# Patient Record
Sex: MALE | Race: WHITE | NOT HISPANIC OR LATINO | Employment: OTHER | URBAN - METROPOLITAN AREA
[De-identification: names, ages, dates, MRNs, and addresses within clinical notes are randomized per-mention and may not be internally consistent; named-entity substitution may affect disease eponyms.]

---

## 2024-06-03 ENCOUNTER — OFFICE VISIT (OUTPATIENT)
Dept: NEUROLOGY | Facility: CLINIC | Age: 24
End: 2024-06-03
Payer: COMMERCIAL

## 2024-06-03 ENCOUNTER — LAB (OUTPATIENT)
Dept: LAB | Facility: HOSPITAL | Age: 24
End: 2024-06-03
Payer: COMMERCIAL

## 2024-06-03 VITALS
WEIGHT: 288 LBS | BODY MASS INDEX: 46.28 KG/M2 | DIASTOLIC BLOOD PRESSURE: 78 MMHG | HEART RATE: 69 BPM | SYSTOLIC BLOOD PRESSURE: 128 MMHG | OXYGEN SATURATION: 94 % | HEIGHT: 66 IN

## 2024-06-03 DIAGNOSIS — R25.1 TREMOR: Primary | ICD-10-CM

## 2024-06-03 DIAGNOSIS — R25.1 TREMOR: ICD-10-CM

## 2024-06-03 LAB
T4 FREE SERPL-MCNC: 1.65 NG/DL (ref 0.92–1.68)
TSH SERPL DL<=0.05 MIU/L-ACNC: 4.11 UIU/ML (ref 0.27–4.2)
VIT B12 BLD-MCNC: 961 PG/ML (ref 211–946)

## 2024-06-03 PROCEDURE — 82607 VITAMIN B-12: CPT

## 2024-06-03 PROCEDURE — 36415 COLL VENOUS BLD VENIPUNCTURE: CPT

## 2024-06-03 PROCEDURE — 84439 ASSAY OF FREE THYROXINE: CPT

## 2024-06-03 PROCEDURE — 99204 OFFICE O/P NEW MOD 45 MIN: CPT | Performed by: PSYCHIATRY & NEUROLOGY

## 2024-06-03 PROCEDURE — 1160F RVW MEDS BY RX/DR IN RCRD: CPT | Performed by: PSYCHIATRY & NEUROLOGY

## 2024-06-03 PROCEDURE — 1159F MED LIST DOCD IN RCRD: CPT | Performed by: PSYCHIATRY & NEUROLOGY

## 2024-06-03 PROCEDURE — 84443 ASSAY THYROID STIM HORMONE: CPT

## 2024-06-03 RX ORDER — PROPRANOLOL HYDROCHLORIDE 40 MG/1
TABLET ORAL
COMMUNITY
Start: 2024-05-28

## 2024-06-03 RX ORDER — DIVALPROEX SODIUM 125 MG/1
125 CAPSULE, COATED PELLETS ORAL
COMMUNITY
Start: 2024-04-18

## 2024-06-03 RX ORDER — RISPERIDONE 4 MG/1
TABLET ORAL
COMMUNITY
Start: 2024-04-26

## 2024-06-03 RX ORDER — PRIMIDONE 50 MG/1
50 TABLET ORAL NIGHTLY
Qty: 30 TABLET | Refills: 5 | Status: SHIPPED | OUTPATIENT
Start: 2024-06-03

## 2024-06-03 RX ORDER — LEVOTHYROXINE SODIUM 0.07 MG/1
TABLET ORAL
COMMUNITY
Start: 2024-05-20

## 2024-06-03 NOTE — PROGRESS NOTES
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  As per parents patient has had tremors in his hands and legs for about 8 months now but ever since he had his gastric sleeve surgery 1 month ago the tremors are more prominent.  They are either at rest or while carrying out tasks such as holding objects in his hands.  Mom also has been diagnosed with essential tremors.  For his mood he has been on risperidone, Depakote and propranolol 40 mg 3 times daily for several years now.     The following portions of the patient's history were reviewed today and updated as of 06/03/2024  : allergies, current medications, past family history, past medical history, past social history, past surgical history, and problem list  These document will be scanned to patient's chart.      Current Outpatient Medications:     Divalproex Sodium (DEPAKOTE SPRINKLE) 125 MG capsule, 1 capsule., Disp: , Rfl:     levothyroxine (SYNTHROID, LEVOTHROID) 75 MCG tablet, , Disp: , Rfl:     propranolol (INDERAL) 40 MG tablet, , Disp: , Rfl:     risperiDONE (risperDAL) 4 MG tablet, , Disp: , Rfl:     primidone (MYSOLINE) 50 MG tablet, Take 1 tablet by mouth Every Night., Disp: 30 tablet, Rfl: 5   Past Medical History:   Diagnosis Date    Anger     Autism     Blind     Left eye-Dentached Retina 2022      Past Surgical History:   Procedure Laterality Date    GASTRIC SLEEVE LAPAROSCOPIC  05/2024      History reviewed. No pertinent family history.   Social History     Socioeconomic History    Marital status: Single   Tobacco Use    Smoking status: Never     Passive exposure: Never     Review of Systems   Unable to perform ROS: Patient nonverbal     Objective:    /78   Pulse 69   Ht 167.6 cm (66\")   Wt 131 kg (288 " lb)   SpO2 94%   BMI 46.48 kg/m²     Neurology Exam:    General apperance: NAD.     Mental status: Alert, awake     Fluency, Naming , Repitition and Comprehension: Nonverbal, uses sign language    Cranial Nerves:   CN II: Visual fields are intact in right eye.  He is blind in the left eye due to detached retina.  Pupils - MICA on the right  CN III, IV and VI: Extraocular movements are intact. Normal saccades.   CN V: Facial sensation is intact.   CN VII: Muscles of facial expression reveal no asymmetry. Intact.   CN VIII: Bilaterally deaf  CN IX and X: Palate elevates symmetrically. Intact  CN XI: Shoulder shrug is intact.   CN XII: Tongue is midline without evidence of atrophy or fasciculation.     Ophthalmoscopic exam of optic disc -deferred    Motor: Resting, postural/kinetic tremors noted in bilateral upper and lower extremities.  He did have a significant tremor while drawing a Green's wheel and while writing his name with his left hand.  Right UE muscle strength 5/5. Normal tone.     Left UE muscle strength 5/5. Normal tone.      Right LE muscle strength5/5. Normal tone.     Left LE muscle strength 5/5. Normal tone.      Sensory: Normal light touch, vibration and pinprick sensation bilaterally.    DTRs: 2+ bilaterally in upper and lower extremities.    Babinski: Negative bilaterally.    Co-ordination: Normal finger-to-nose, heel to shin B/L.    Rhomberg: Negative.    Gait: Normal.    Cardiovascular: Regular rate and rhythm without murmur, gallop or rub.    Assessment and Plan:  1. Tremor  He most likely has an essential tremor worsened by his medications  I have told his parents to speak to his psychiatrist about switching Depakote to another mood stabilizer such as lamotrigine  I will check blood work today including a vitamin B12 level and thyroid function test  I will start him on primidone 50 mg nightly  He is already on propranolol 40 mg 3 times daily for his mood    - Vitamin B12; Future  - TSH+Free  T4; Future       Return in about 3 months (around 9/3/2024).     I spent over 40 minutes with the patient face to face out of which over 50% (30 minutes) was spent in management, instructions and education.     Maria Antonia Whitman MD

## 2024-09-05 ENCOUNTER — OFFICE VISIT (OUTPATIENT)
Dept: NEUROLOGY | Facility: CLINIC | Age: 24
End: 2024-09-05
Payer: COMMERCIAL

## 2024-09-05 VITALS
HEIGHT: 66 IN | OXYGEN SATURATION: 94 % | DIASTOLIC BLOOD PRESSURE: 80 MMHG | SYSTOLIC BLOOD PRESSURE: 124 MMHG | HEART RATE: 64 BPM | WEIGHT: 261 LBS | BODY MASS INDEX: 41.95 KG/M2

## 2024-09-05 DIAGNOSIS — R25.1 TREMOR: Primary | ICD-10-CM

## 2024-09-05 PROCEDURE — 1160F RVW MEDS BY RX/DR IN RCRD: CPT | Performed by: PSYCHIATRY & NEUROLOGY

## 2024-09-05 PROCEDURE — 1159F MED LIST DOCD IN RCRD: CPT | Performed by: PSYCHIATRY & NEUROLOGY

## 2024-09-05 PROCEDURE — 99214 OFFICE O/P EST MOD 30 MIN: CPT | Performed by: PSYCHIATRY & NEUROLOGY

## 2024-09-05 RX ORDER — PRIMIDONE 50 MG/1
TABLET ORAL
Qty: 90 TABLET | Refills: 5 | Status: SHIPPED | OUTPATIENT
Start: 2024-09-05

## 2024-09-05 RX ORDER — FLUVOXAMINE MALEATE 100 MG
TABLET ORAL
COMMUNITY
Start: 2024-08-06

## 2024-09-05 NOTE — PROGRESS NOTES
Subjective:    CC: Oskar Amaya is seen today  for Tremors       Current visit-as per grandmom (patient lives with his grandparents) patient's tremors have improved minimally after starting primidone 50 mg nightly.  He also continues to take propranolol 40 mg 3 times daily for his mood.  His dose of Depakote was reduced to just 1 capsule of 125 mg in the morning that he takes with risperidone for his anger outburst which are currently well-controlled.  Patient has lost over 80 pounds after his gastric sleeve surgery.  Also goes to exercise 3 times a week with his grandparents.  His blood work including vitamin B12 level and thyroid function test was normal.    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  As per parents patient has had tremors in his hands and legs for about 8 months now but ever since he had his gastric sleeve surgery 1 month ago the tremors are more prominent.  They are either at rest or while carrying out tasks such as holding objects in his hands.  Grandmom also has been diagnosed with essential tremors.  For his mood he has been on risperidone, Depakote and propranolol 40 mg 3 times daily for several years now.     The following portions of the patient's history were reviewed today and updated as of 06/03/2024  : allergies, current medications, past family history, past medical history, past social history, past surgical history, and problem list  These document will be scanned to patient's chart.      Current Outpatient Medications:     Divalproex Sodium (DEPAKOTE SPRINKLE) 125 MG capsule, 1 capsule., Disp: , Rfl:     fluvoxaMINE (LUVOX) 100 MG tablet, , Disp: , Rfl:     levothyroxine (SYNTHROID, LEVOTHROID) 75 MCG tablet, , Disp: , Rfl:     primidone (MYSOLINE) 50 MG tablet, Take 1 tablet in the morning and 2 tablets at night, Disp: 90 tablet, Rfl: 5     "propranolol (INDERAL) 40 MG tablet, , Disp: , Rfl:     risperiDONE (risperDAL) 4 MG tablet, , Disp: , Rfl:    Past Medical History:   Diagnosis Date    Anger     Autism     Blind     Left eye-Dentached Retina 2022      Past Surgical History:   Procedure Laterality Date    GASTRIC SLEEVE LAPAROSCOPIC  05/2024      History reviewed. No pertinent family history.   Social History     Socioeconomic History    Marital status: Single   Tobacco Use    Smoking status: Never     Passive exposure: Never     Review of Systems   Unable to perform ROS: Patient nonverbal       Objective:    /80   Pulse 64   Ht 167.6 cm (66\")   Wt 118 kg (261 lb)   SpO2 94%   BMI 42.13 kg/m²     Neurology Exam:    General apperance: NAD.     Mental status: Alert, awake     Fluency, Naming , Repitition and Comprehension: Nonverbal, uses sign language    Cranial Nerves:   CN II: Visual fields are intact in right eye.  He is blind in the left eye due to detached retina.  Pupils - MICA on the right  CN III, IV and VI: Extraocular movements are intact. Normal saccades.   CN V: Facial sensation is intact.   CN VII: Muscles of facial expression reveal no asymmetry. Intact.   CN VIII: Bilaterally deaf  CN IX and X: Palate elevates symmetrically. Intact  CN XI: Shoulder shrug is intact.   CN XII: Tongue is midline without evidence of atrophy or fasciculation.     Ophthalmoscopic exam of optic disc -deferred    Motor: Resting, postural/kinetic tremors noted in bilateral upper and lower extremities (mildly improved since his last visit).  He did have a significant tremor while drawing a Green's wheel and while writing his name with his left hand.  Right UE muscle strength 5/5. Normal tone.     Left UE muscle strength 5/5. Normal tone.      Right LE muscle strength5/5. Normal tone.     Left LE muscle strength 5/5. Normal tone.      Sensory: Normal light touch, vibration and pinprick sensation bilaterally.    DTRs: 2+ bilaterally in upper and lower " extremities.    Babinski: Negative bilaterally.    Co-ordination: Normal finger-to-nose, heel to shin B/L.    Rhomberg: Negative.    Gait: Normal.    Cardiovascular: Regular rate and rhythm without murmur, gallop or rub.    Assessment and Plan:  1. Tremor  He most likely has an essential tremor worsened by his medications  Labs including B12 level and thyroid function test was normal  I will gradually increase his primidone to 50 mg in the morning and 100 mg at night.  If tremors do not improve I may order a DaTscan to see if he has tremor predominant Parkinson's  He is already on propranolol 40 mg 3 times daily for his mood      Return in about 3 months (around 12/5/2024).     I spent over 25 minutes with the patient face to face out of which over 50% (20 minutes) was spent in management, instructions and education.     Maria Antonia Whitman MD

## 2024-12-10 ENCOUNTER — OFFICE VISIT (OUTPATIENT)
Dept: NEUROLOGY | Facility: CLINIC | Age: 24
End: 2024-12-10
Payer: COMMERCIAL

## 2024-12-10 VITALS
SYSTOLIC BLOOD PRESSURE: 132 MMHG | OXYGEN SATURATION: 97 % | HEIGHT: 66 IN | DIASTOLIC BLOOD PRESSURE: 82 MMHG | WEIGHT: 246 LBS | BODY MASS INDEX: 39.53 KG/M2 | HEART RATE: 65 BPM

## 2024-12-10 DIAGNOSIS — G20.A2 PARKINSON'S DISEASE WITHOUT DYSKINESIA, WITH FLUCTUATING MANIFESTATIONS: Primary | ICD-10-CM

## 2024-12-10 PROCEDURE — 1159F MED LIST DOCD IN RCRD: CPT | Performed by: PSYCHIATRY & NEUROLOGY

## 2024-12-10 PROCEDURE — 99214 OFFICE O/P EST MOD 30 MIN: CPT | Performed by: PSYCHIATRY & NEUROLOGY

## 2024-12-10 PROCEDURE — 1160F RVW MEDS BY RX/DR IN RCRD: CPT | Performed by: PSYCHIATRY & NEUROLOGY

## 2024-12-10 RX ORDER — PRIMIDONE 50 MG/1
100 TABLET ORAL 2 TIMES DAILY
Qty: 120 TABLET | Refills: 5 | Status: SHIPPED | OUTPATIENT
Start: 2024-12-10

## 2024-12-10 NOTE — PROGRESS NOTES
Subjective:    CC: Oskar Amaya is seen today  for Tremors       Current visit-grandmother states that she stopped giving Oskar his Depakote which has helped with the tremors as they have reduced in the past few weeks.  He also increased his dose of primidone to 50/100 mg that he takes in addition to propranolol 40 mg 3 times daily for his mood.  His mood continues to be stable on risperidone and fluvoxamine.  He has continued to lose weight after his gastric sleeve surgery for a total of 93 pounds.    Last visit-as per grandmom (patient lives with his grandparents) patient's tremors have improved minimally after starting primidone 50 mg nightly.  He also continues to take propranolol 40 mg 3 times daily for his mood.  His dose of Depakote was reduced to just 1 capsule of 125 mg in the morning that he takes with risperidone for his anger outburst which are currently well-controlled.  Patient has lost over 80 pounds after his gastric sleeve surgery.  Also goes to exercise 3 times a week with his grandparents.  His blood work including vitamin B12 level and thyroid function test was normal.    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  As per parents patient has had tremors in his hands and legs for about 8 months now but ever since he had his gastric sleeve surgery 1 month ago the tremors are more prominent.  They are either at rest or while carrying out tasks such as holding objects in his hands.  Grandmom also has been diagnosed with essential tremors.  For his mood he has been on risperidone, Depakote and propranolol 40 mg 3 times daily for several years now.     The following portions of the patient's history were reviewed today and updated as of 06/03/2024  : allergies, current medications, past family history, past medical history, past social history, past surgical history,  "and problem list  These document will be scanned to patient's chart.      Current Outpatient Medications:     fluvoxaMINE (LUVOX) 100 MG tablet, , Disp: , Rfl:     levothyroxine (SYNTHROID, LEVOTHROID) 75 MCG tablet, , Disp: , Rfl:     primidone (MYSOLINE) 50 MG tablet, Take 2 tablets by mouth 2 (Two) Times a Day., Disp: 120 tablet, Rfl: 5    propranolol (INDERAL) 40 MG tablet, , Disp: , Rfl:     risperiDONE (risperDAL) 4 MG tablet, , Disp: , Rfl:    Past Medical History:   Diagnosis Date    Anger     Autism     Blind     Left eye-Dentached Retina 2022      Past Surgical History:   Procedure Laterality Date    GASTRIC SLEEVE LAPAROSCOPIC  05/2024      History reviewed. No pertinent family history.   Social History     Socioeconomic History    Marital status: Single   Tobacco Use    Smoking status: Never     Passive exposure: Never     Review of Systems   Unable to perform ROS: Patient nonverbal       Objective:    /82   Pulse 65   Ht 167.6 cm (66\")   Wt 112 kg (246 lb)   SpO2 97%   BMI 39.71 kg/m²     Neurology Exam:    General apperance: NAD.     Mental status: Alert, awake     Fluency, Naming , Repitition and Comprehension: Nonverbal, uses sign language    Cranial Nerves:   CN II: Visual fields are intact in right eye.  He is blind in the left eye due to detached retina.  Pupils - MICA on the right  CN III, IV and VI: Extraocular movements are intact. Normal saccades.   CN V: Facial sensation is intact.   CN VII: Muscles of facial expression reveal no asymmetry. Intact.   CN VIII: Bilaterally deaf  CN IX and X: Palate elevates symmetrically. Intact  CN XI: Shoulder shrug is intact.   CN XII: Tongue is midline without evidence of atrophy or fasciculation.     Ophthalmoscopic exam of optic disc -deferred    Motor: Mild resting, postural and kinetic tremors in both upper extremities improved since last visit.  Previously-resting, postural/kinetic tremors noted in bilateral upper and lower extremities " (mildly improved since his last visit).  He did have a significant tremor while drawing a Property Moose's wheel and while writing his name with his left hand.  Right UE muscle strength 5/5. Normal tone.     Left UE muscle strength 5/5. Normal tone.      Right LE muscle strength5/5. Normal tone.     Left LE muscle strength 5/5. Normal tone.      Sensory: Normal light touch, vibration and pinprick sensation bilaterally.    DTRs: 2+ bilaterally in upper and lower extremities.    Babinski: Negative bilaterally.    Co-ordination: Normal finger-to-nose, heel to shin B/L.    Rhomberg: Negative.    Gait: Normal with dependent tremor noted in both hands.    Cardiovascular: Regular rate and rhythm without murmur, gallop or rub.    Assessment and Plan:  1.  Parkinson's disease  Could have an essential tremor versus  tremor predominant Parkinson's (could be due to risperidone that he started 2 years ago)  Labs including B12 level and thyroid function test was normal  I will get a DaTscan  He can increase his primidone to 100 mg twice daily  He is already on propranolol 40 mg 3 times daily for his mood      Return in about 3 months (around 3/10/2025).     I spent over 25 minutes with the patient face to face out of which over 50% (20 minutes) was spent in management, instructions and education.     Maria Antonia Whitman MD

## 2024-12-16 ENCOUNTER — TELEPHONE (OUTPATIENT)
Dept: NEUROLOGY | Facility: CLINIC | Age: 24
End: 2024-12-16
Payer: COMMERCIAL

## 2024-12-16 NOTE — TELEPHONE ENCOUNTER
CALLED PATIENT TO LET HIM KNOW ABOUT [facility] APPOINTMENT - APPOINTMENT IS ON 12/27/24 AT 9:00AM BUT PATIENT MUST ARRIVE BY 8:30AM FOR CHECK IN AT [facility]  [facility] [place] - LEFT MESSAGE FOR A RETURN CALL

## 2025-01-03 ENCOUNTER — TELEPHONE (OUTPATIENT)
Dept: NEUROLOGY | Facility: CLINIC | Age: 25
End: 2025-01-03
Payer: COMMERCIAL

## 2025-01-03 NOTE — TELEPHONE ENCOUNTER
CALLED PATIENT'S CARETAKER/GRANDMOTHER AT DR CARVAJAL'S REQUEST TO LET HER KNOW THAT THE [facility] SCAN WAS UNREMARKABLE AND THAT THERE WAS NO EVIDENCE OF PARKINSONS

## 2025-03-20 ENCOUNTER — OFFICE VISIT (OUTPATIENT)
Dept: NEUROLOGY | Facility: CLINIC | Age: 25
End: 2025-03-20
Payer: COMMERCIAL

## 2025-03-20 VITALS
WEIGHT: 247.6 LBS | HEART RATE: 74 BPM | SYSTOLIC BLOOD PRESSURE: 122 MMHG | OXYGEN SATURATION: 96 % | BODY MASS INDEX: 39.79 KG/M2 | HEIGHT: 66 IN | DIASTOLIC BLOOD PRESSURE: 74 MMHG

## 2025-03-20 DIAGNOSIS — R25.1 TREMOR: Primary | ICD-10-CM

## 2025-03-20 PROCEDURE — 1159F MED LIST DOCD IN RCRD: CPT | Performed by: PSYCHIATRY & NEUROLOGY

## 2025-03-20 PROCEDURE — 99213 OFFICE O/P EST LOW 20 MIN: CPT | Performed by: PSYCHIATRY & NEUROLOGY

## 2025-03-20 PROCEDURE — 1160F RVW MEDS BY RX/DR IN RCRD: CPT | Performed by: PSYCHIATRY & NEUROLOGY

## 2025-03-20 RX ORDER — PRIMIDONE 50 MG/1
100 TABLET ORAL 2 TIMES DAILY
Qty: 120 TABLET | Refills: 5 | Status: SHIPPED | OUTPATIENT
Start: 2025-03-20

## 2025-03-20 RX ORDER — LAMOTRIGINE 25 MG/1
TABLET, CHEWABLE ORAL
COMMUNITY
Start: 2025-03-17

## 2025-03-20 RX ORDER — PROPRANOLOL HYDROCHLORIDE 40 MG/1
40 TABLET ORAL 3 TIMES DAILY
Qty: 90 TABLET | Refills: 5 | Status: SHIPPED | OUTPATIENT
Start: 2025-03-20

## 2025-03-20 NOTE — PROGRESS NOTES
Subjective:    CC: Oskar Amaya is seen today  for Follow-up       Current visit-patient had his DaTscan that was normal.  He increased his primidone to 100 mg twice daily and also continues to take propranolol 40 mg 3 times a day.  His tremors have improved significantly since adjusting his primidone dose.  He just has mild tremors occasionally while carrying out tasks as per grandmother.  Even grandma has tremors however she states that she did not have them till she had her stroke.  She does not want to go on any medications as she is already on a lot of immunosuppressants after her kidney transplant.      Last visit-grandmother states that she stopped giving Oskar his Depakote which has helped with the tremors as they have reduced in the past few weeks.  He also increased his dose of primidone to 50/100 mg that he takes in addition to propranolol 40 mg 3 times daily for his mood.  His mood continues to be stable on risperidone and fluvoxamine.  He has continued to lose weight after his gastric sleeve surgery for a total of 93 pounds.    Last visit-as per grandmom (patient lives with his grandparents) patient's tremors have improved minimally after starting primidone 50 mg nightly.  He also continues to take propranolol 40 mg 3 times daily for his mood.  His dose of Depakote was reduced to just 1 capsule of 125 mg in the morning that he takes with risperidone for his anger outburst which are currently well-controlled.  Patient has lost over 80 pounds after his gastric sleeve surgery.  Also goes to exercise 3 times a week with his grandparents.  His blood work including vitamin B12 level and thyroid function test was normal.    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  As per parents patient has had tremors in his hands and legs for about 8 months now but ever since he had his  "gastric sleeve surgery 1 month ago the tremors are more prominent.  They are either at rest or while carrying out tasks such as holding objects in his hands.  Grandmom also has been diagnosed with essential tremors.  For his mood he has been on risperidone, Depakote and propranolol 40 mg 3 times daily for several years now.     The following portions of the patient's history were reviewed today and updated as of 06/03/2024  : allergies, current medications, past family history, past medical history, past social history, past surgical history, and problem list  These document will be scanned to patient's chart.      Current Outpatient Medications:     lamoTRIgine (LaMICtal) 25 MG chewable tablet chewable tablet, , Disp: , Rfl:     primidone (MYSOLINE) 50 MG tablet, Take 2 tablets by mouth 2 (Two) Times a Day., Disp: 120 tablet, Rfl: 5    propranolol (INDERAL) 40 MG tablet, Take 1 tablet by mouth 3 (Three) Times a Day., Disp: 90 tablet, Rfl: 5    fluvoxaMINE (LUVOX) 100 MG tablet, , Disp: , Rfl:     levothyroxine (SYNTHROID, LEVOTHROID) 75 MCG tablet, , Disp: , Rfl:     risperiDONE (risperDAL) 4 MG tablet, , Disp: , Rfl:    Past Medical History:   Diagnosis Date    Anger     Autism     Blind     Left eye-Dentached Retina 2022      Past Surgical History:   Procedure Laterality Date    GASTRIC SLEEVE LAPAROSCOPIC  05/2024      History reviewed. No pertinent family history.   Social History     Socioeconomic History    Marital status: Single   Tobacco Use    Smoking status: Never     Passive exposure: Never   Vaping Use    Vaping status: Never Used   Substance and Sexual Activity    Alcohol use: Defer    Drug use: Defer    Sexual activity: Defer     Review of Systems   Unable to perform ROS: Patient nonverbal       Objective:    /74   Pulse 74   Ht 167.6 cm (66\")   Wt 112 kg (247 lb 9.6 oz)   SpO2 96%   BMI 39.96 kg/m²     Neurology Exam:    General apperance: NAD.     Mental status: Alert, awake     Fluency, " Naming , Repitition and Comprehension: Nonverbal, uses sign language    Cranial Nerves:   CN II: Visual fields are intact in right eye.  He is blind in the left eye due to detached retina.  Pupils - MICA on the right  CN III, IV and VI: Extraocular movements are intact. Normal saccades.   CN V: Facial sensation is intact.   CN VII: Muscles of facial expression reveal no asymmetry. Intact.   CN VIII: Bilaterally deaf  CN IX and X: Palate elevates symmetrically. Intact  CN XI: Shoulder shrug is intact.   CN XII: Tongue is midline without evidence of atrophy or fasciculation.     Ophthalmoscopic exam of optic disc -deferred    Motor: Exceedingly mild postural tremors noted in both hands.  Previously-resting, postural/kinetic tremors noted in bilateral upper and lower extremities (mildly improved since his last visit).  He did have a significant tremor while drawing a Green's wheel and while writing his name with his left hand.  Right UE muscle strength 5/5. Normal tone.     Left UE muscle strength 5/5. Normal tone.      Right LE muscle strength5/5. Normal tone.     Left LE muscle strength 5/5. Normal tone.      Sensory: Normal light touch, vibration and pinprick sensation bilaterally.    DTRs: 2+ bilaterally in upper and lower extremities.    Babinski: Negative bilaterally.    Co-ordination: Normal finger-to-nose, heel to shin B/L.    Rhomberg: Negative.    Gait: Normal with dependent tremor noted in both hands.    Cardiovascular: Regular rate and rhythm without murmur, gallop or rub.    Assessment and Plan:  1.  Tremor  He does have an essential tremor.  DaTscan was unremarkable  Labs including B12 level and thyroid function test was normal  I have told him to continue primidone 100 mg twice daily and propranolol 40 mg 3 times daily that was prescribed for his mood       Return in about 6 months (around 9/20/2025).         Maria Antonia Whitman MD